# Patient Record
Sex: FEMALE | Race: WHITE | NOT HISPANIC OR LATINO | Employment: FULL TIME
[De-identification: names, ages, dates, MRNs, and addresses within clinical notes are randomized per-mention and may not be internally consistent; named-entity substitution may affect disease eponyms.]

---

## 2017-06-10 ENCOUNTER — HEALTH MAINTENANCE LETTER (OUTPATIENT)
Age: 32
End: 2017-06-10

## 2017-07-28 ENCOUNTER — THERAPY VISIT (OUTPATIENT)
Dept: PHYSICAL THERAPY | Facility: CLINIC | Age: 32
End: 2017-07-28
Payer: COMMERCIAL

## 2017-07-28 DIAGNOSIS — M25.519 SHOULDER PAIN: ICD-10-CM

## 2017-07-28 DIAGNOSIS — M54.2 NECK PAIN: Primary | ICD-10-CM

## 2017-07-28 PROCEDURE — 97110 THERAPEUTIC EXERCISES: CPT | Mod: GP | Performed by: PHYSICAL THERAPIST

## 2017-07-28 PROCEDURE — 97161 PT EVAL LOW COMPLEX 20 MIN: CPT | Mod: GP | Performed by: PHYSICAL THERAPIST

## 2017-07-28 NOTE — PROGRESS NOTES
[facility] Initial Evaluation      Subjective:    Patient is a 32 year old female presenting with rehab cervical spine hpi.   Prudence Mccabe is a 32 year old female with a cervical spine condition.  Condition occurred with:  Insidious onset and repetition/overuse.  Condition occurred: during recreation/sport.  This is a new and chronic condition  Pt saw MD for chronic R shoulder and neck pain on 6/21/17, she reports R shoulder has been chronic issue but neck pain and R finger tingling symptoms increased after a fall at work recently.  .    Patient reports pain:  Cervical right side.  Radiates to:  Shoulder right, upper arm right and hand right.  Pain is described as aching and is intermittent and reported as 1/10.  Associated symptoms:  Tingling, numbness and loss of motion/stiffness. Pain is worse during the day.  Symptoms are exacerbated by rotating head, change of position and certain positions and relieved by rest.  Since onset symptoms are unchanged.    Previous treatment includes physical therapy.  There was moderate improvement following previous treatment.  General health as reported by patient is good.  Pertinent medical history includes:  High blood pressure.  Medical allergies: yes (bactrim).  Surgical history: umbilical hernia.  Current medications:  None as reported by the patient.  Current occupation is  and .  Patient is working in normal job without restrictions.  Primary job tasks include:  Prolonged sitting, prolonged standing, lifting and driving.    Barriers include:  None as reported by the patient.    Red flags:  None as reported by the patient.        CERVICAL:    Posture: forward head and rounded shoulders difficulty correcting and maintaining    Neurological:    Motor Deficit:  Myotomes L R   C4 (shoulder elevation) 5/5 5/5   C5 (shoulder abduction) 5/5 4/5   C6 (elbow flexion) 5/5 5/5   C7 (elbow extension) 5/5 5/5   C8 (thumb extension)     T1  (finger add/abd)      Strength (lb)     MMT R teres minor 4/5, infraspinatus 4/5  Sensory Deficit, Reflexes, Dural Signs: tingling at first distal finger on RUE in C6 dermatome    AROM: (Major, Moderate, Minimal or Nil loss)  Movement Loss Wilbert Mod Min Nil Pain   Protrusion        Flexion        Retraction        Extension        Left Rotation   x  62degs   Right Rotation   x  63degs   Left Side Bending    x 40degs   Right Side bending   x  30degs     Repeated movement testing: some relief with repeated retraction      Static Tests: ligament tests negative, sharps pursor, shear, kick tests, ULNT test negative, limited L C3-7 joint mobility, spurling's + on R  Other Tests: + neer and sams margo on R, +full and empty can      Pt with 1/4 positive clinical tests for cervical radiculopathy, spurling's to R instantly reproduced tingling/numbness on R finger that resolved once out of taht position. Goals of patient is to return to normal daily functional tasks painfree with normal AROM and be able to return to high level volleyball participation for this spring. Plan to address cervical and shoulder soft tissue restriction, strengthen posterior shoulder and RTC, posture, modalities for pain control.                        Objective:    System    Physical Exam    General     ROS    Assessment/Plan:      Patient is a 32 year old female with cervical and right side shoulder complaints.    Patient has the following significant findings with corresponding treatment plan.                Diagnosis 1:  Neck and shoulder pain  Pain -  hot/cold therapy, US, electric stimulation, mechanical traction, manual therapy, education and home program  Decreased ROM/flexibility - manual therapy, therapeutic exercise, therapeutic activity and home program  Decreased joint mobility - manual therapy, therapeutic exercise, therapeutic activity and home program  Decreased strength - therapeutic exercise, therapeutic activities and home  program  Impaired posture - neuro re-education, therapeutic activities and home program    Therapy Evaluation Codes:   1) History comprised of:   Personal factors that impact the plan of care:      None.    Comorbidity factors that impact the plan of care are:      None.     Medications impacting care: None.  2) Examination of Body Systems comprised of:   Body structures and functions that impact the plan of care:      Cervical spine and Shoulder.   Activity limitations that impact the plan of care are:      Sports.  3) Clinical presentation characteristics are:   Stable/Uncomplicated.  4) Decision-Making    Low complexity using standardized patient assessment instrument and/or measureable assessment of functional outcome.  Cumulative Therapy Evaluation is: Low complexity.    Previous and current functional limitations:  (See Goal Flow Sheet for this information)    Short term and Long term goals: (See Goal Flow Sheet for this information)     Communication ability:  Patient appears to be able to clearly communicate and understand verbal and written communication and follow directions correctly.  Treatment Explanation - The following has been discussed with the patient:   RX ordered/plan of care  Anticipated outcomes  Possible risks and side effects  This patient would benefit from PT intervention to resume normal activities.   Rehab potential is good.    Frequency:  1 X week, once daily  Duration:  for 12 weeks  Discharge Plan:  Achieve all LTG.  Independent in home treatment program.  Return to previous functional level by discharge.  Reach maximal therapeutic benefit.    Please refer to the daily flowsheet for treatment today, total treatment time and time spent performing 1:1 timed codes.

## 2017-07-28 NOTE — MR AVS SNAPSHOT
"              After Visit Summary   2017    Prudence Mccabe    MRN: [id number]           Patient Information     Date Of Birth          1985        Visit Information        Provider Department      2017 2:10 PM Ramos Ayon PT [facility]        Today's Diagnoses     Neck pain    -  1    Shoulder pain           Follow-ups after your visit        Who to contact     If you have questions or need follow up information about today's clinic visit or your schedule please contact [facility] directly at [phone].  Normal or non-critical lab and imaging results will be communicated to you by AxioMxhart, letter or phone within 4 business days after the clinic has received the results. If you do not hear from us within 7 days, please contact the clinic through Vedero Softwaret or phone. If you have a critical or abnormal lab result, we will notify you by phone as soon as possible.  Submit refill requests through Giphy or call your pharmacy and they will forward the refill request to us. Please allow 3 business days for your refill to be completed.          Additional Information About Your Visit        MyChart Information     Giphy lets you send messages to your doctor, view your test results, renew your prescriptions, schedule appointments and more. To sign up, go to [email or web address] . Click on \"Log in\" on the left side of the screen, which will take you to the Welcome page. Then click on \"Sign up Now\" on the right side of the page.     You will be asked to enter the access code listed below, as well as some personal information. Please follow the directions to create your username and password.     Your access code is: [email or web address]  Expires: 10/26/2017  3:55 PM     Your access code will  in 90 days. If you need help or a new code, please call your [place] clinic or [phone].        Care EveryWhere ID     This is your Care EveryWhere ID. This could be used " by other organizations to access your [place] medical records  [id number]         Blood Pressure from Last 3 Encounters:   No data found for BP    Weight from Last 3 Encounters:   No data found for Wt              We Performed the Following     HC PT EVAL, LOW COMPLEXITY     [facility] INITIAL EVAL REPORT     THERAPEUTIC EXERCISES        Primary Care Provider Office Phone # Fax #    Aron Gonsalves [phone]       [facility] [address] [place] [address]        Equal Access to Services     [facility]: Hadii aad ku hadasho Soomaali, waaxda luqadaha, qaybta kaalmada adeegyada, waxay idiin hayaan adeeg kharash la'aan . So [facility] [phone].    ATENCIÓN: Si habla español, tiene a dutton disposición servicios gratuitos de asistencia lingüística. [facility] [phone].    We comply with applicable federal civil rights laws and [place] laws. We do not discriminate on the basis of race, color, national origin, age, disability sex, sexual orientation or gender identity.            Thank you!     Thank you for choosing [facility]  for your care. Our goal is always to provide you with excellent care. Hearing back from our patients is one way we can continue to improve our services. Please take a few minutes to complete the written survey that you may receive in the mail after your visit with us. Thank you!             Your Updated Medication List - Protect others around you: Learn how to safely use, store and throw away your medicines at www.disposemymeds.org.      Notice  As of 7/28/2017  3:55 PM    You have not been prescribed any medications.

## 2017-07-28 NOTE — LETTER
[facility]  [address]  [place] [address]  [phone]    2017    Re: Prudence Mccabe   :   1985  MRN:  [id number]   REFERRING PHYSICIAN:   [facility]    Date of Initial Evaluation:  17  Visits:  Rxs Used: 1  Reason for Referral:      [place] [facility] Initial Evaluation    Subjective:  Patient is a 32 year old female presenting with rehab cervical spine hpi.   Prudence Mccabe is a 32 year old female with a cervical spine condition.  Condition occurred with:  Insidious onset and repetition/overuse.  Condition occurred: during recreation/sport.  This is a new and chronic condition  Pt saw MD for chronic R shoulder and neck pain on 17, she reports R shoulder has been chronic issue but neck pain and R finger tingling symptoms increased after a fall at work recently.  .    Patient reports pain:  Cervical right side.  Radiates to:  Shoulder right, upper arm right and hand right.  Pain is described as aching and is intermittent and reported as 1/10.  Associated symptoms:  Tingling, numbness and loss of motion/stiffness. Pain is worse during the day.  Symptoms are exacerbated by rotating head, change of position and certain positions and relieved by rest.  Since onset symptoms are unchanged.    Previous treatment includes physical therapy.  There was moderate improvement following previous treatment.  General health as reported by patient is good.  Pertinent medical history includes:  High blood pressure.  Medical allergies: yes (bactrim).  Surgical history: umbilical hernia.  Current medications:  None as reported by the patient.  Current occupation is  and .  Patient is working in normal job without restrictions.  Primary job tasks include:  Prolonged sitting, prolonged standing, lifting and driving.  Barriers include:  None as reported by the patient.  Red flags:  None as reported by  the patient.    CERVICAL:    Posture: forward head and rounded shoulders difficulty correcting and maintaining    Neurological:    Motor Deficit:  Myotomes L R   C4 (shoulder elevation) 5/5 5/5   C5 (shoulder abduction) 5/5 4/5   C6 (elbow flexion) 5/5 5/5   C7 (elbow extension) 5/5 5/5   C8 (thumb extension)     T1 (finger add/abd)      Strength (lb)     MMT R teres minor 4/5, infraspinatus 4/5  Sensory Deficit, Reflexes, Dural Signs: tingling at first distal finger on RUE in C6 dermatome    AROM: (Major, Moderate, Minimal or Nil loss)  Movement Loss Wilbert Mod Min Nil Pain   Protrusion        Flexion        Retraction        Extension        Left Rotation   x  62degs   Right Rotation   x  63degs   Left Side Bending    x 40degs   Right Side bending   x  30degs     Repeated movement testing: some relief with repeated retraction    Static Tests: ligament tests negative, sharps pursor, shear, kick tests, ULNT test negative, limited L C3-7 joint mobility, spurling's + on R    Other Tests: + neer and sams margo on R, +full and empty can  Pt with 1/4 positive clinical tests for cervical radiculopathy, spurling's to R instantly reproduced tingling/numbness on R finger that resolved once out of taht position. Goals of patient is to return to normal daily functional tasks painfree with normal AROM and be able to return to high level volleyball participation for this spring. Plan to address cervical and shoulder soft tissue restriction, strengthen posterior shoulder and RTC, posture, modalities for pain control.    Assessment/Plan:    Patient is a 32 year old female with cervical and right side shoulder complaints.    Patient has the following significant findings with corresponding treatment plan.                Diagnosis 1:  Neck and shoulder pain  Pain -  hot/cold therapy, US, electric stimulation, mechanical traction, manual therapy, education and home program  Decreased ROM/flexibility - manual therapy,  therapeutic exercise, therapeutic activity and home program  Decreased joint mobility - manual therapy, therapeutic exercise, therapeutic activity and home program  Decreased strength - therapeutic exercise, therapeutic activities and home program  Impaired posture - neuro re-education, therapeutic activities and home program    Therapy Evaluation Codes:   1) History comprised of:   Personal factors that impact the plan of care:      None.    Comorbidity factors that impact the plan of care are:      None.     Medications impacting care: None.  2) Examination of Body Systems comprised of:   Body structures and functions that impact the plan of care:      Cervical spine and Shoulder.   Activity limitations that impact the plan of care are:      Sports.  3) Clinical presentation characteristics are:   Stable/Uncomplicated.  4) Decision-Making    Low complexity using standardized patient assessment instrument and/or measureable assessment of functional outcome.    Cumulative Therapy Evaluation is: Low complexity.  Previous and current functional limitations:  (See Goal Flow Sheet for this information)    Short term and Long term goals: (See Goal Flow Sheet for this information)   Communication ability:  Patient appears to be able to clearly communicate and understand verbal and written communication and follow directions correctly.  Treatment Explanation - The following has been discussed with the patient:   RX ordered/plan of care  Anticipated outcomes  Possible risks and side effects  This patient would benefit from PT intervention to resume normal activities.   Rehab potential is good.    Frequency:  1 X week, once daily  Duration:  for 12 weeks  Discharge Plan:  Achieve all LTG.  Independent in home treatment program.  Return to previous functional level by discharge.  Reach maximal therapeutic benefit.    Thank you for your referral.    INQUIRIES  Therapist: Ramos Ayon DPT PHYSICAL THERAPY  [id number]  [place] [address]  Phone: [phone]  Fax: [phone]

## 2017-08-04 ENCOUNTER — THERAPY VISIT (OUTPATIENT)
Dept: PHYSICAL THERAPY | Facility: CLINIC | Age: 32
End: 2017-08-04
Payer: COMMERCIAL

## 2017-08-04 DIAGNOSIS — M25.519 SHOULDER PAIN: ICD-10-CM

## 2017-08-04 DIAGNOSIS — M54.2 NECK PAIN: ICD-10-CM

## 2017-08-04 PROCEDURE — 97112 NEUROMUSCULAR REEDUCATION: CPT | Mod: GP | Performed by: PHYSICAL THERAPIST

## 2017-08-04 PROCEDURE — 97035 APP MDLTY 1+ULTRASOUND EA 15: CPT | Mod: GP | Performed by: PHYSICAL THERAPIST

## 2017-08-04 PROCEDURE — 97110 THERAPEUTIC EXERCISES: CPT | Mod: GP | Performed by: PHYSICAL THERAPIST

## 2017-08-25 ENCOUNTER — THERAPY VISIT (OUTPATIENT)
Dept: PHYSICAL THERAPY | Facility: CLINIC | Age: 32
End: 2017-08-25
Payer: COMMERCIAL

## 2017-08-25 DIAGNOSIS — M25.519 SHOULDER PAIN: ICD-10-CM

## 2017-08-25 DIAGNOSIS — M54.2 NECK PAIN: ICD-10-CM

## 2017-08-25 PROCEDURE — 97112 NEUROMUSCULAR REEDUCATION: CPT | Mod: GP | Performed by: PHYSICAL THERAPIST

## 2017-08-25 PROCEDURE — 97110 THERAPEUTIC EXERCISES: CPT | Mod: GP | Performed by: PHYSICAL THERAPIST

## 2017-08-25 NOTE — PROGRESS NOTES
Subjective:    HPI                    Objective:    System    Physical Exam    General     ROS    Assessment/Plan:      PROGRESS  REPORT    Progress reporting period is from 7/28/17 to 8/25/17.     SUBJECTIVE  Subjective: pt reports neck has been feeling better but gets tight occasionally. admits her finger is feeling normal now and does not have the tingling/numbness in R distal pointer finger.   Current Pain level: 0/10   Initial Pain level: 1/10   Changes in function: Yes, see goal flow sheet for change in function   Adverse reactions: None;   ,     The objective findings are from DOS 8/25/17.    OBJECTIVE  Objective: AROM cervical sidebending L: 37degs R: 35degs rotation L:72degs R: 69degs      ASSESSMENT/PLAN  Updated problem list and treatment plan: Diagnosis 1:  Neck/shoulder pain  STG/LTGs have been met or progress has been made towards goals:  Yes, is performing all ADLs including working out and lifting painfree, progressing towards painree return to volleyball.  Assessment of Progress: The patient's condition is improving.  The patient's condition has potential to improve.  Self Management Plans:  Patient has been instructed in a home treatment program.  Patient is independent in a home treatment program.  I have re-evaluated this patient and find that the nature, scope, duration and intensity of the therapy is appropriate for the medical condition of the patient.  Prudence continues to require the following intervention to meet STG and LTG's: PT    Recommendations:  This patient would benefit from continued therapy.     Frequency:  1-2 X a month, once daily  Duration:  for 2 months          Please refer to the daily flowsheet for treatment today, total treatment time and time spent performing 1:1 timed codes.

## 2017-08-25 NOTE — LETTER
[facility]  [address]  [place] [address]  [phone]    2017    Re: Prudence Mccabe   :   1985  MRN:  [id number]   REFERRING PHYSICIAN:   [facility]    Date of Initial Evaluation:  17  Visits:  Rxs Used: 3  Reason for Referral:     Neck pain  Shoulder pain    PROGRESS  REPORT  Progress reporting period is from 17 to 17.     SUBJECTIVE  Subjective: pt reports neck has been feeling better but gets tight occasionally. admits her finger is feeling normal now and does not have the tingling/numbness in R distal pointer finger.   Current Pain level: 0/10   Initial Pain level: 1/10   Changes in function: Yes, see goal flow sheet for change in function   Adverse reactions: None   The objective findings are from DOS 17.    OBJECTIVE  Objective: AROM cervical sidebending L: 37degs R: 35degs rotation L:72degs R: 69degs      ASSESSMENT/PLAN  Updated problem list and treatment plan: Diagnosis 1:  Neck/shoulder pain  STG/LTGs have been met or progress has been made towards goals:  Yes, is performing all ADLs including working out and lifting painfree, progressing towards painree return to volleyball.  Assessment of Progress: The patient's condition is improving.  The patient's condition has potential to improve.  Self Management Plans:  Patient has been instructed in a home treatment program.  Patient is independent in a home treatment program.  I have re-evaluated this patient and find that the nature, scope, duration and intensity of the therapy is appropriate for the medical condition of the patient.  Prudence continues to require the following intervention to meet STG and LTG's: PT    Recommendations:  This patient would benefit from continued therapy.     Frequency:  1-2 X a month, once daily  Duration:  for 2 months    Thank you for your referral.    INQUIRIES  Therapist: Ramos Ayon DPT PHYSICAL  THERAPY  [address] [place] [address]  Phone: [phone]  Fax: [phone]

## 2017-08-25 NOTE — MR AVS SNAPSHOT
"              After Visit Summary   8/25/2017    Prudence Mccabe    MRN: [id number]           Patient Information     Date Of Birth          1985        Visit Information        Provider Department      8/25/2017 2:50 PM Ramos Ayon PT [facility]        Today's Diagnoses     Neck pain        Shoulder pain           Follow-ups after your visit        Your next 10 appointments already scheduled     Sep 22, 2017  5:30 PM CDT   [facility] Ext[facility] ([facility])    [address]  [place] [address] [phone]              Who to contact     If you have questions or need follow up information about today's clinic visit or your schedule please contact [facility] directly at [phone].  Normal or non-critical lab and imaging results will be communicated to you by Avancerthart, letter or phone within 4 business days after the clinic has received the results. If you do not hear from us within 7 days, please contact the clinic through Avancerthart or phone. If you have a critical or abnormal lab result, we will notify you by phone as soon as possible.  Submit refill requests through [email or web address] or call your pharmacy and they will forward the refill request to us. Please allow 3 business days for your refill to be completed.          Additional Information About Your Visit        MyChart Information     [email or web address] lets you send messages to your doctor, view your test results, renew your prescriptions, schedule appointments and more. To sign up, go to [email or web address] . Click on \"Log in\" on the left side of the screen, which will take you to the Welcome page. Then click on \"Sign up Now\" on the right side of the page.     You will be asked to enter the access code listed below, as well as some personal information. Please follow the directions to create your username and password.     Your access code is: [email or web address]  Expires: " 10/26/2017  3:55 PM     Your access code will  in 90 days. If you need help or a new code, please call your [place] clinic or [phone].        Care EveryWhere ID     This is your Care EveryWhere ID. This could be used by other organizations to access your [place] medical records  [id number]         Blood Pressure from Last 3 Encounters:   No data found for BP    Weight from Last 3 Encounters:   No data found for Wt              We Performed the Following     [facility] PROGRESS NOTES REPORT     NEUROMUSCULAR RE-EDUCATION     THERAPEUTIC EXERCISES        Primary Care Provider Office Phone # Fax #    Aron Gonsalves [phone]       [facility] [address] [place] [address]        Equal Access to Services     [facility]: Hadii aad ku hadasho Soomaali, waaxda luqadaha, qaybta kaalmada adeegyada, waxay idiin hayshraddhan carola rausch . So [facility] [phone].    ATENCIÓN: Si habla español, tiene a dutton disposición servicios gratuitos de asistencia lingüística. Llame al [phone].    We comply with applicable federal civil rights laws and [place] laws. We do not discriminate on the basis of race, color, national origin, age, disability sex, sexual orientation or gender identity.            Thank you!     Thank you for choosing [facility]  for your care. Our goal is always to provide you with excellent care. Hearing back from our patients is one way we can continue to improve our services. Please take a few minutes to complete the written survey that you may receive in the mail after your visit with us. Thank you!             Your Updated Medication List - Protect others around you: Learn how to safely use, store and throw away your medicines at www.disposemymeds.org.      Notice  As of 2017  6:08 PM    You have not been prescribed any medications.

## 2017-09-22 ENCOUNTER — THERAPY VISIT (OUTPATIENT)
Dept: PHYSICAL THERAPY | Facility: CLINIC | Age: 32
End: 2017-09-22
Payer: COMMERCIAL

## 2017-09-22 DIAGNOSIS — M54.2 NECK PAIN: ICD-10-CM

## 2017-09-22 DIAGNOSIS — M25.519 SHOULDER PAIN: ICD-10-CM

## 2017-09-22 PROCEDURE — 97140 MANUAL THERAPY 1/> REGIONS: CPT | Mod: GP | Performed by: PHYSICAL THERAPIST

## 2017-09-22 PROCEDURE — 97112 NEUROMUSCULAR REEDUCATION: CPT | Mod: GP | Performed by: PHYSICAL THERAPIST

## 2017-09-22 PROCEDURE — 97110 THERAPEUTIC EXERCISES: CPT | Mod: GP | Performed by: PHYSICAL THERAPIST

## 2017-10-10 ENCOUNTER — THERAPY VISIT (OUTPATIENT)
Dept: PHYSICAL THERAPY | Facility: CLINIC | Age: 32
End: 2017-10-10
Payer: COMMERCIAL

## 2017-10-10 DIAGNOSIS — M25.519 SHOULDER PAIN: ICD-10-CM

## 2017-10-10 DIAGNOSIS — M54.2 NECK PAIN: ICD-10-CM

## 2017-10-10 PROCEDURE — 97140 MANUAL THERAPY 1/> REGIONS: CPT | Mod: GP | Performed by: PHYSICAL THERAPIST

## 2017-10-10 PROCEDURE — 97110 THERAPEUTIC EXERCISES: CPT | Mod: GP | Performed by: PHYSICAL THERAPIST

## 2017-10-27 ENCOUNTER — THERAPY VISIT (OUTPATIENT)
Dept: PHYSICAL THERAPY | Facility: CLINIC | Age: 32
End: 2017-10-27
Payer: COMMERCIAL

## 2017-10-27 DIAGNOSIS — M25.519 SHOULDER PAIN: ICD-10-CM

## 2017-10-27 DIAGNOSIS — M54.2 NECK PAIN: ICD-10-CM

## 2017-10-27 PROCEDURE — 97140 MANUAL THERAPY 1/> REGIONS: CPT | Mod: GP | Performed by: PHYSICAL THERAPIST

## 2017-10-27 PROCEDURE — 97110 THERAPEUTIC EXERCISES: CPT | Mod: GP | Performed by: PHYSICAL THERAPIST

## 2017-10-27 NOTE — LETTER
[facility]  [address]  [place] [address]  [phone]    2017    Re: Prudence Mccabe   :   1985  MRN:  [id number]   REFERRING PHYSICIAN:   [facility]    Date of Initial Evaluation:  17  Visits:  Rxs Used: 6  Reason for Referral:     Neck pain  Shoulder pain    PROGRESS  REPORT  Progress reporting period is from 17 to 10/27/17.     SUBJECTIVE  Subjective: pt reports volleyball is going well, played in her first tournament last weekend which went well but did have muscle soreness after and since. admits shoulder tightness.   Current Pain level: 4/10   Initial Pain level: 10   Changes in function: Yes, see goal flow sheet for change in function   Adverse reactions: None  The objective findings are from DOS 10/27/17.    OBJECTIVE  Objective: Tight R upper trap, intermittent R bicep irritation and R thumb and first two finger paresthesias during session that improved with median nerve mobility. full AROM R shoulder with pain during abd      ASSESSMENT/PLAN  Updated problem list and treatment plan: Diagnosis 1:  Neck and shoulder pain  STG/LTGs have been met or progress has been made towards goals:  Yes, progressing towards return to volleyball painfree.  Assessment of Progress: The patient's condition is improving.  The patient's condition has potential to improve.  Self Management Plans:  Patient is independent in a home treatment program.  I have re-evaluated this patient and find that the nature, scope, duration and intensity of the therapy is appropriate for the medical condition of the patient.  Prudence continues to require the following intervention to meet STG and LTG's: PT    Recommendations:  Prudence is doing well, she has been playing volleyball at a high level with some good games and some bad games in regards to her shoulder pain. We are addressing neck and shoulder pain and current plan is to continue to  progress shoulder strength and stability.    Thank you for your referral.    INQUIRIES  Therapist: Ramos [facility]yon [facility]  [address] [place] [address]  Phone: [phone]  Fax: [phone]

## 2017-10-27 NOTE — MR AVS SNAPSHOT
"              After Visit Summary   10/27/2017    Prudence Mccabe    MRN: [id number]           Patient Information     Date Of Birth          1985        Visit Information        Provider Department      10/27/2017 7:40 AM Ramos Ayon PT [facility]        Today's Diagnoses     Neck pain        Shoulder pain           Follow-ups after your visit        Who to contact     If you have questions or need follow up information about today's clinic visit or your schedule please contact [facility] directly at [phone].  Normal or non-critical lab and imaging results will be communicated to you by [employer], letter or phone within 4 business days after the clinic has received the results. If you do not hear from us within 7 days, please contact the clinic through [employer] or phone. If you have a critical or abnormal lab result, we will notify you by phone as soon as possible.  Submit refill requests through [employer] or call your pharmacy and they will forward the refill request to us. Please allow 3 business days for your refill to be completed.          Additional Information About Your Visit        MyChart Information     [employer] lets you send messages to your doctor, view your test results, renew your prescriptions, schedule appointments and more. To sign up, go to [email or web address] . Click on \"Log in\" on the left side of the screen, which will take you to the Welcome page. Then click on \"Sign up Now\" on the right side of the page.     You will be asked to enter the access code listed below, as well as some personal information. Please follow the directions to create your username and password.     Your access code is: [email or web address]  Expires: 2018  5:13 PM     Your access code will  in 90 days. If you need help or a new code, please call your [place] clinic or [phone].        Care EveryWhere ID     This is your Care EveryWhere ID. This could be used by " other organizations to access your [place] medical records  [id number]         Blood Pressure from Last 3 Encounters:   No data found for BP    Weight from Last 3 Encounters:   No data found for Wt              We Performed the Following     [facility] PROGRESS NOTES REPORT     MANUAL THER TECH,1+REGIONS,EA 15 MIN     THERAPEUTIC EXERCISES        Primary Care Provider Office Phone # Fax #    Aron Gonsalves [phone]       [facility] [address] [place] [address]        Equal Access to Services     : Hadii aad ku hadasho Soomaali, waaxda luqadaha, qaybta kaalmada adeegyada, waxay idiin hayaan adeeg kharash la'aan . So [facility] [phone].    ATENCIÓN: Si habla español, tiene a dutton disposición servicios gratuitos de asistencia lingüística. [facility] [phone].    We comply with applicable federal civil rights laws and [place] laws. We do not discriminate on the basis of race, color, national origin, age, disability, sex, sexual orientation, or gender identity.            Thank you!     Thank you for choosing [facility]  for your care. Our goal is always to provide you with excellent care. Hearing back from our patients is one way we can continue to improve our services. Please take a few minutes to complete the written survey that you may receive in the mail after your visit with us. Thank you!             Your Updated Medication List - Protect others around you: Learn how to safely use, store and throw away your medicines at www.disposemymeds.org.      Notice  As of 10/27/2017  5:13 PM    You have not been prescribed any medications.

## 2017-10-27 NOTE — PROGRESS NOTES
Subjective:    HPI                    Objective:    System    Physical Exam    General     ROS    Assessment/Plan:      PROGRESS  REPORT    Progress reporting period is from 8/25/17 to 10/27/17.     SUBJECTIVE  Subjective: pt reports volleyball is going well, played in her first tournament last weekend which went well but did have muscle soreness after and since. admits shoulder tightness.   Current Pain level: 4/10   Initial Pain level: 1/10   Changes in function: Yes, see goal flow sheet for change in function   Adverse reactions: None;   ,     The objective findings are from DOS 10/27/17.    OBJECTIVE  Objective: Tight R upper trap, intermittent R bicep irritation and R thumb and first two finger paresthesias during session that improved with median nerve mobility. full AROM R shoulder with pain during abd      ASSESSMENT/PLAN  Updated problem list and treatment plan: Diagnosis 1:  Neck and shoulder pain  STG/LTGs have been met or progress has been made towards goals:  Yes, progressing towards return to volleyball painfree.  Assessment of Progress: The patient's condition is improving.  The patient's condition has potential to improve.  Self Management Plans:  Patient is independent in a home treatment program.  I have re-evaluated this patient and find that the nature, scope, duration and intensity of the therapy is appropriate for the medical condition of the patient.  Prudence continues to require the following intervention to meet STG and LTG's: PT    Recommendations:  Prudence is doing well, she has been playing volleyball at a high level with some good games and some bad games in regards to her shoulder pain. We are addressing neck and shoulder pain and current plan is to continue to progress shoulder strength and stability.    Please refer to the daily flowsheet for treatment today, total treatment time and time spent performing 1:1 timed codes.

## 2017-12-08 PROBLEM — M54.2 NECK PAIN: Status: RESOLVED | Noted: 2017-07-28 | Resolved: 2017-12-08

## 2017-12-08 PROBLEM — M25.519 SHOULDER PAIN: Status: RESOLVED | Noted: 2017-07-28 | Resolved: 2017-12-08

## 2019-08-02 ENCOUNTER — THERAPY VISIT (OUTPATIENT)
Dept: PHYSICAL THERAPY | Facility: CLINIC | Age: 34
End: 2019-08-02
Payer: COMMERCIAL

## 2019-08-02 DIAGNOSIS — M25.519 SHOULDER PAIN: Primary | ICD-10-CM

## 2019-08-02 PROBLEM — M25.511 RIGHT SHOULDER PAIN: Status: ACTIVE | Noted: 2017-07-28

## 2019-08-02 PROCEDURE — 97110 THERAPEUTIC EXERCISES: CPT | Mod: GP | Performed by: PHYSICAL THERAPIST

## 2019-08-02 NOTE — PROGRESS NOTES
[facility] Initial Evaluation  Subjective:    Type of problem:  Right shoulder   Condition occurred with:  Repetition/overuse and lifting. This is a recurrent condition   Problem details: 7/11/19 pt saw MD for recurrent R shoulder pain, has been lifting and goal to play volleyball. Pt reports 2/10 pain with lifting and reports health as excellent, PMH asthma, high blood pressure and had surgical hernia repair. Works as a  and  with primary tasks including computer work, lifting carrying and prolonged standing..   Patient reports pain:  Anterior and lateral. Radiates to: none. Associated symptoms:  Loss of motion/stiffness and loss of strength. Symptoms are exacerbated by carrying, certain positions and lifting and relieved by nothing.                      Objective:      SHOULDER:    Cervical Screen: normal and painfree    Shoulder:   PROM L PROM R AROM L AROM R MMT L MMT R   Flex   175 167 5/5 4/5   Abd   175 175 5/5 4/5   Full Can     5/5 4/5   Empty Can     5/5 4/5   IR   T6 T7 5/5 4/5+   ER   75 70 5/5 4/5   Ext/IR           Scapulothoraic Rhythm: normal and painfree    Palpation: mild TTP R anterior and anterolateral shoulder    Special tests:   L R   Impingement     Neer's neg neg   Hawkin's-Emiliano + +   Coracoid Impingement     Internal impingement     Labral     Anterior Slide     [place]'s     Crank     Instability     Apprehension (anterior)     Relocation (anterior)     Anterior Load & Shift     Posterior Load & Shift     Posterior instability (with 90 degrees flex)     Multi-Directional Instability      Sulcus     Biceps      Speed's neg neg   Rotator Cuff Tear     Drop Arm neg    Belly Press + +   Lift off  + +     GH Mobility  L  R   Posterior glide wnl limited   Inferior glide wnl wnl   Anterior glide wnl wnl               System    Physical Exam    General     ROS    Assessment/Plan:    Patient is a 34 year old female with right side shoulder complaints.     Patient has the following significant findings with corresponding treatment plan.                Diagnosis 1:  R shoulder pain  Pain -  hot/cold therapy, US, electric stimulation, manual therapy, splint/taping/bracing/orthotics, education and home program  Decreased ROM/flexibility - manual therapy, therapeutic exercise, therapeutic activity and home program  Decreased joint mobility - manual therapy, therapeutic exercise, therapeutic activity and home program  Decreased strength - therapeutic exercise, therapeutic activities and home program      Previous and current functional limitations:  (See Goal Flow Sheet for this information)    Short term and Long term goals: (See Goal Flow Sheet for this information)     Communication ability:  Patient appears to be able to clearly communicate and understand verbal and written communication and follow directions correctly.  Treatment Explanation - The following has been discussed with the patient:   RX ordered/plan of care  Anticipated outcomes  Possible risks and side effects  This patient would benefit from PT intervention to resume normal activities.   Rehab potential is good.    Frequency:  1 X week, once daily  Duration:  for 10 weeks  Discharge Plan:  Achieve all LTG.  Independent in home treatment program.  Reach maximal therapeutic benefit.    Please refer to the daily flowsheet for treatment today, total treatment time and time spent performing 1:1 timed codes.

## 2019-08-02 NOTE — LETTER
[facility]  [address]  [place] [address]  [phone]    2019    Re: Prudence Mccabe   :   1985  MRN:  [id number]   REFERRING PHYSICIAN:   [facility]    Date of Initial Evaluation:  2019  Visits: 1 Rxs Used: 1  Reason for Referral:  Shoulder pain    [facility] Initial Evaluation  Subjective:  Type of problem:  Right shoulder   Condition occurred with:  Repetition/overuse and lifting. This is a recurrent condition   Problem details: 19 pt saw MD for recurrent R shoulder pain, has been lifting and goal to play volleyball. Pt reports 2/10 pain with lifting and reports health as excellent, PMH asthma, high blood pressure and had surgical hernia repair. Works as a  and  with primary tasks including computer work, lifting carrying and prolonged standing..   Patient reports pain:  Anterior and lateral. Radiates to: none. Associated symptoms:  Loss of motion/stiffness and loss of strength. Symptoms are exacerbated by carrying, certain positions and lifting and relieved by nothing.    Pertinent medical history includes:  Asthma and high blood pressure.    Surgeries include:  Other. Other surgery history details: hernia repair. Primary job tasks include:  Computer work, lifting/carrying and prolonged standing. Patient is /.     Objective:  SHOULDER:  Cervical Screen: normal and painfree    Shoulder:   PROM L PROM R AROM L AROM R MMT L MMT R   Flex   175 167 5/5 4/5   Abd   175 175 5/5 4/5   Full Can     5/5 4/5   Empty Can     5/5 4/5   IR   T6 T7 5/5 4/5+   ER   75 70 5/5 4/5   Ext/IR           Scapulothoraic Rhythm: normal and painfree    Palpation: mild TTP R anterior and anterolateral shoulder    Special tests:   L R   Impingement     Neer's neg neg   Hawkin's-Emiliano + +   Coracoid Impingement     Internal impingement     Labral     Anterior Slide      [place]'s     Crank     Instability     Apprehension (anterior)     Relocation (anterior)     Anterior Load & Shift     Posterior Load & Shift     Posterior instability (with 90 degrees flex)     Multi-Directional Instability      Sulcus     Biceps      Speed's neg neg   Rotator Cuff Tear     Drop Arm neg    Belly Press + +   Lift off  + +     GH Mobility  L  R   Posterior glide wnl limited   Inferior glide wnl wnl   Anterior glide wnl wnl     Assessment/Plan:    Patient is a 34 year old female with right side shoulder complaints.    Patient has the following significant findings with corresponding treatment plan.                Diagnosis 1:  R shoulder pain  Pain -  hot/cold therapy, US, electric stimulation, manual therapy, splint/taping/bracing/orthotics, education and home program  Decreased ROM/flexibility - manual therapy, therapeutic exercise, therapeutic activity and home program  Decreased joint mobility - manual therapy, therapeutic exercise, therapeutic activity and home program  Decreased strength - therapeutic exercise, therapeutic activities and home program      Previous and current functional limitations:  (See Goal Flow Sheet for this information)    Short term and Long term goals: (See Goal Flow Sheet for this information)     Communication ability:  Patient appears to be able to clearly communicate and understand verbal and written communication and follow directions correctly.  Treatment Explanation - The following has been discussed with the patient:   RX ordered/plan of care  Anticipated outcomes  Possible risks and side effects  Re: Prudence Mccabe   :   1985  This patient would benefit from PT intervention to resume normal activities.   Rehab potential is good.    Frequency:  1 X week, once daily  Duration:  for 10 weeks  Discharge Plan:  Achieve all LTG.  Independent in home treatment program.  Reach maximal therapeutic benefit.    Thank you for your  referral.    INQUIRIES  Therapist: [facility]  [address] [place] [address]  Phone: [phone]  Fax: [phone]

## 2019-08-05 NOTE — PROGRESS NOTES
[facility] Initial Evaluation  Subjective:       Pertinent medical history includes:  Asthma and high blood pressure.    Surgeries include:  Other. Other surgery history details: hernia repair.     Primary job tasks include:  Computer work, lifting/carrying and prolonged standing.           Patient is /.                           Objective:  System    Physical Exam    General     ROS    Assessment/Plan:

## 2019-08-09 ENCOUNTER — THERAPY VISIT (OUTPATIENT)
Dept: PHYSICAL THERAPY | Facility: CLINIC | Age: 34
End: 2019-08-09
Payer: COMMERCIAL

## 2019-08-09 DIAGNOSIS — M25.519 SHOULDER PAIN: Primary | ICD-10-CM

## 2019-08-09 PROCEDURE — 97112 NEUROMUSCULAR REEDUCATION: CPT | Mod: GP | Performed by: PHYSICAL THERAPIST

## 2019-08-09 PROCEDURE — 97110 THERAPEUTIC EXERCISES: CPT | Mod: GP | Performed by: PHYSICAL THERAPIST

## 2019-08-16 ENCOUNTER — THERAPY VISIT (OUTPATIENT)
Dept: PHYSICAL THERAPY | Facility: CLINIC | Age: 34
End: 2019-08-16
Payer: COMMERCIAL

## 2019-08-16 DIAGNOSIS — M25.519 SHOULDER PAIN: Primary | ICD-10-CM

## 2019-08-16 PROCEDURE — 97110 THERAPEUTIC EXERCISES: CPT | Mod: GP | Performed by: PHYSICAL THERAPIST

## 2019-08-16 PROCEDURE — 97112 NEUROMUSCULAR REEDUCATION: CPT | Mod: GP | Performed by: PHYSICAL THERAPIST

## 2019-09-10 ENCOUNTER — THERAPY VISIT (OUTPATIENT)
Dept: PHYSICAL THERAPY | Facility: CLINIC | Age: 34
End: 2019-09-10
Payer: COMMERCIAL

## 2019-09-10 DIAGNOSIS — M25.519 SHOULDER PAIN: Primary | ICD-10-CM

## 2019-09-10 PROCEDURE — 97140 MANUAL THERAPY 1/> REGIONS: CPT | Mod: GP | Performed by: PHYSICAL THERAPIST

## 2019-09-10 PROCEDURE — 97112 NEUROMUSCULAR REEDUCATION: CPT | Mod: GP | Performed by: PHYSICAL THERAPIST

## 2020-01-31 NOTE — PROGRESS NOTES
"Subjective:  HPI                    Objective:  System    Physical Exam    General     ROS    Assessment/Plan:    DISCHARGE REPORT    Progress reporting period is from 8/2/19 to 9/10/19.     SUBJECTIVE  Subjective: pt reports her R shoulder hasn't been \"too bad\". begins volleyball league on 10/1   Current Pain level: 0/10   Initial Pain level: 2/10   Changes in function: No changes noted in function since last SOAP note   Adverse reactions: None;   ,     The objective findings are from DOS 9/10/19.    OBJECTIVE  Objective: improved tolerance to activity      ASSESSMENT/PLAN  Updated problem list and treatment plan: Diagnosis 1:  R shoulder pain  STG/LTGs have been met or progress has been made towards goals:  Yes, progressing towards return to volleyball  Assessment of Progress: The patient's condition is improving.  Self Management Plans:  Patient is independent in a home treatment program.  Prudence continues to require the following intervention to meet STG and LTG's: PT intervention is no longer required to meet STG/LTG.  We will discharge this patient from PT.    Recommendations:  This patient is ready to be discharged from therapy and continue their home treatment program.    Please refer to the daily flowsheet for treatment today, total treatment time and time spent performing 1:1 timed codes.    "

## 2022-01-20 NOTE — PROGRESS NOTES
Physical Therapy Initial Examination/Evaluation  January 21, 2022    Therapist Impression: Prudence is a 36 year old year old female referred to physical therapy by Terrie Gonzales MD for treatment of chronic right shoulder pain. Patient presents to physical therapy with c/o right arm pain and numbness. Signs and symptoms are consistent with shoulder impingement with radicular numbness in C6 distribution. Due to these impairments, patient is unable to perform full work duties, lift greater than 30 pounds or sleep comfortably on right side. Patient will benefit from skilled PT in order to address impairments/limitations in order to return patient to goals and prior level of function.     MD Order: 1-2x/wk for 6 weeks for strength, ROM, pain control     Subjective:  Presenting Complaint Right shoulder pain   Mechanism of Injury  Fall, slipped and fell on elbow   DOI (onset)/ DOS 12/16/2021   Functional Limitations Sleeping, lifting, carrying    Notable PMH See Epic chart    Prior treatment PT at this location prior   good   Prior Imaging  x-ray- negative findings no fractures        Pain/Presentation    Pain Level   Rest: 1/10  ; Activity: 3/10   Location   right shoulder; numbness into thumb and pointer when waking up    Frequency Intermittent   Described as aching, dull and sharp, numbness    Alleviated by  Rest   Progression of Sx Gradually getting better.   Time of Day  Activity related and Position related   Sleeping  Interrupted due to current issue; sleeping on the right side is tender       Social Factors/Lifestyle  Occupation and Duties [employer]   prolonged sitting, lifting/carrying, pushing/pulling, driving  -No more than 30#    Barriers at home/work None as reported by patient   Medications Anti-inflammatory (800 mg in morning and repeat as needed)    Current HEP/Exercise Unable to play volleyball since fall; running on treadmill    Patient Reported Health Excellent      Patient Goals:  1) back  to work at full capacity   2) volleyball as able   3) able to sleep on right side without issue     Other factors/PMH that may impact care: none      The history is provided by the patient.   Patient Health History  Prudence Mitchell being seen for right arm injury.     Problem began: 12/16/2021.   Problem occurred: from a fall    Pain is reported as 1/10 on pain scale.  General health as reported by patient is excellent.  Pertinent medical history includes: asthma, high blood pressure and numbness/tingling.         Other surgery history details: hernia repair .    Current medications:  High blood pressure medication. Other medications details: birth control .    Current occupation is / .   Primary job tasks include:  Computer work, lifting/carrying and pushing/pulling.                                    SHOULDER EXAMINATION  Handedness: Right    CERVICAL SCREEN  AROM: WNL/symmetrical ; tight with left lateral flexion with pulling and tightness in upper trap and right side of neck  -Notes numbness/tingling in thumb and pointer (C6) that occurs with laying on side or stretching tight muscles.     THORACIC SPINE SCREEN  WNL      STATIC POSTURE  Forward head: mild     Rounded shoulders:mild  Shoulder internally rotated: greater on R        KINEMATIC SCAPULAR ASSESSMENT  General Dynamic Scapular Assessment: No obvious findings    SHOULDER RANGE OF MOTION  AROM Flexion Abduction ER Base Ext/IR or hand behind back level   Left WFL WFL WFL T6   Right WFL WFL WFL T10   Pain: end range flexion; near 90 deg abduction, and with reaching behind back. Pain mostly in anterior shoulder     JOINT MOBILITY  Hypomobile in posterior capsule mobility at 90 deg abduction     PROM Flexion Abd 90-90 ER 90-90 IR Scap Stabilized   Left WFL WFL  WFL WFL   Right WFL WFL 90+ 26    GH indicates pure glenohumeral ROM    SHOULDER STRENGTH  MMT Flexion Scaption ER IR   Left 5/5 5/5 5/5 5/5   Right 4/5 4/5 4/5 4/5       SPECIAL  TESTS      L R   Impingement       Neer's - +   Hawkin's-Emiliano - +   Coracoid Impingement - -   Internal impingement - -   Labral       Anterior Slide - -   [place]'s - -   Crank - -   Instability       Apprehension (anterior) - -   Relocation (anterior) - -   Anterior Load & Shift - -   Posterior Load & Shift - -   Posterior instability (with 90 degrees flex) - -   Multi-Directional Instability        Sulcus - -   Biceps        Speed's - -   Rotator Cuff Tear       Drop Arm - -   Belly Press - -   Lift off  - -       PALPATION  Left: No tenderness to palpation  Right: Moderate tenderness to palpation at distal triceps, upper trapezius, and posterior shoulder musculature. Also tender on anterior shoulder around biceps attachment    System    Physical Exam    General     ROS    Assessment/Plan:    Patient is a 36 year old female with right side shoulder complaints. Patient demonstrates mobility restrictions with posterior rotator cuff with IR and horizontal abduction. Has significant pec and upper trapezius tightness as well as noted muscle weakness. Numbness can be recreated in C6 nerve distribution when stretch is increased but not with cervical motions.        Patient has the following significant findings with corresponding treatment plan.                Diagnosis 1:  Chronic right shoulder pain  Pain -  hot/cold therapy, electric stimulation, mechanical traction and manual therapy  Decreased ROM/flexibility - manual therapy and therapeutic exercise  Decreased joint mobility - manual therapy and therapeutic exercise  Decreased strength - therapeutic exercise and therapeutic activities  Impaired muscle performance - neuro re-education  Decreased function - therapeutic activities    Therapy Evaluation Codes:     Cumulative Therapy Evaluation is: Low complexity.    Previous and current functional limitations:  (See Goal Flow Sheet for this information)    Short term and Long term goals: (See Goal Flow Sheet for  this information)     Communication ability:  Patient appears to be able to clearly communicate and understand verbal and written communication and follow directions correctly.  Treatment Explanation - The following has been discussed with the patient:   RX ordered/plan of care  Anticipated outcomes  Possible risks and side effects  This patient would benefit from PT intervention to resume normal activities.   Rehab potential is excellent.    Frequency:  1-2 X week, once daily  Duration:  for 8 weeks  Discharge Plan:  Achieve all LTG.  Independent in home treatment program.  Reach maximal therapeutic benefit.    Please refer to the daily flowsheet for treatment today, total treatment time and time spent performing 1:1 timed codes.

## 2022-01-21 ENCOUNTER — THERAPY VISIT (OUTPATIENT)
Dept: PHYSICAL THERAPY | Facility: CLINIC | Age: 37
End: 2022-01-21
Payer: OTHER MISCELLANEOUS

## 2022-01-21 DIAGNOSIS — M25.511 RIGHT SHOULDER PAIN: Primary | ICD-10-CM

## 2022-01-21 PROCEDURE — 97161 PT EVAL LOW COMPLEX 20 MIN: CPT | Mod: GP

## 2022-01-21 PROCEDURE — 97110 THERAPEUTIC EXERCISES: CPT | Mod: GP

## 2022-01-21 NOTE — LETTER
[facility]  [address]  [place] [address]  [phone]    2022    Re: Prudence iMtchell   :   1985  MRN:  [id number]   REFERRING PHYSICIAN:   Terrie FERREIRA [facility]    Date of Initial Evaluation:  22  Visits:  Rxs Used: 1  Reason for Referral:  Right shoulder pain    EVALUATION SUMMARY    Physical Therapy Initial Examination/Evaluation  2022    Therapist Impression: Prudence is a 36 year old year old female referred to physical therapy by Terrie Gonzales MD for treatment of chronic right shoulder pain. Patient presents to physical therapy with c/o right arm pain and numbness. Signs and symptoms are consistent with shoulder impingement with radicular numbness in C6 distribution. Due to these impairments, patient is unable to perform full work duties, lift greater than 30 pounds or sleep comfortably on right side. Patient will benefit from skilled PT in order to address impairments/limitations in order to return patient to goals and prior level of function.     MD Order: 1-2x/wk for 6 weeks for strength, ROM, pain control     Subjective:  Presenting Complaint Right shoulder pain   Mechanism of Injury  Fall, slipped and fell on elbow   DOI (onset)/ DOS 2021   Functional Limitations Sleeping, lifting, carrying    Notable PMH See Epic chart    Prior treatment PT at this location prior   good   Prior Imaging  x-ray- negative findings no fractures        Pain/Presentation    Pain Level   Rest: /10  ; Activity: 3/10   Location   right shoulder; numbness into thumb and pointer when waking up    Frequency Intermittent   Described as aching, dull and sharp, numbness    Alleviated by  Rest   Progression of Sx Gradually getting better.   Time of Day  Activity related and Position related   Sleeping  Interrupted due to current issue; sleeping on the right side is tender       Social  Factors/Lifestyle  Occupation and Duties [employer]   prolonged sitting, lifting/carrying, pushing/pulling, driving  -No more than 30#    Barriers at home/work None as reported by patient   Medications Anti-inflammatory (800 mg in morning and repeat as needed)    Current HEP/Exercise Unable to play volleyball since fall; running on treadmill    Patient Reported Health Excellent      Patient Goals:  1) back to work at full capacity   2) volleyball as able   3) able to sleep on right side without issue     Other factors/PMH that may impact care: none    The history is provided by the patient.     Patient Health History  Prudence Mitchell being seen for right arm injury.   Problem began: 12/16/2021.   Problem occurred: from a fall    Pain is reported as 1/10 on pain scale.  General health as reported by patient is excellent.  Pertinent medical history includes: asthma, high blood pressure and numbness/tingling.   Other surgery history details: hernia repair .    Current medications:  High blood pressure medication. Other medications details: birth control .    Current occupation is / .   Primary job tasks include:  Computer work, lifting/carrying and pushing/pulling.     SHOULDER EXAMINATION  Handedness: Right    CERVICAL SCREEN  AROM: WNL/symmetrical ; tight with left lateral flexion with pulling and tightness in upper trap and right side of neck  -Notes numbness/tingling in thumb and pointer (C6) that occurs with laying on side or stretching tight muscles.     THORACIC SPINE SCREEN  WNL    STATIC POSTURE  Forward head: mild     Rounded shoulders:mild  Shoulder internally rotated: greater on R      KINEMATIC SCAPULAR ASSESSMENT  General Dynamic Scapular Assessment: No obvious findings    SHOULDER RANGE OF MOTION  AROM Flexion Abduction ER Base Ext/IR or hand behind back level   Left WFL WFL WFL T6   Right WFL WFL WFL T10   Pain: end range flexion; near 90 deg abduction, and with  reaching behind back. Pain mostly in anterior shoulder     JOINT MOBILITY  Hypomobile in posterior capsule mobility at 90 deg abduction     PROM Flexion Abd 90-90 ER 90-90 IR Scap Stabilized   Left WFL WFL  WFL WFL   Right WFL WFL 90+ 26    GH indicates pure glenohumeral ROM    SHOULDER STRENGTH  MMT Flexion Scaption ER IR   Left 5/5 5/5 5/5 5/5   Right 4/5 4/5 4/5 4/5     SPECIAL TESTS      L R   Impingement       Neer's - +   Hawkin's-Emiliano - +   Coracoid Impingement - -   Internal impingement - -   Labral       Anterior Slide - -   Sebastian's - -   Crank - -   Instability       Apprehension (anterior) - -   Relocation (anterior) - -   Anterior Load & Shift - -   Posterior Load & Shift - -   Posterior instability (with 90 degrees flex) - -   Multi-Directional Instability        Sulcus - -   Biceps        Speed's - -   Rotator Cuff Tear       Drop Arm - -   Belly Press - -   Lift off  - -       PALPATION  Left: No tenderness to palpation  Right: Moderate tenderness to palpation at distal triceps, upper trapezius, and posterior shoulder musculature. Also tender on anterior shoulder around biceps attachment    Assessment/Plan:    Patient is a 36 year old female with right side shoulder complaints. Patient demonstrates mobility restrictions with posterior rotator cuff with IR and horizontal abduction. Has significant pec and upper trapezius tightness as well as noted muscle weakness. Numbness can be recreated in C6 nerve distribution when stretch is increased but not with cervical motions.    Patient has the following significant findings with corresponding treatment plan.                Diagnosis 1:  Chronic right shoulder pain  Pain -  hot/cold therapy, electric stimulation, mechanical traction and manual therapy  Decreased ROM/flexibility - manual therapy and therapeutic exercise  Decreased joint mobility - manual therapy and therapeutic exercise  Decreased strength - therapeutic exercise and therapeutic  activities  Impaired muscle performance - neuro re-education  Decreased function - therapeutic activities    Therapy Evaluation Codes:     Cumulative Therapy Evaluation is: Low complexity.  Previous and current functional limitations:  (See Goal Flow Sheet for this information)    Short term and Long term goals: (See Goal Flow Sheet for this information)   Communication ability:  Patient appears to be able to clearly communicate and understand verbal and written communication and follow directions correctly.  Treatment Explanation - The following has been discussed with the patient:   RX ordered/plan of care  Anticipated outcomes  Possible risks and side effects  This patient would benefit from PT intervention to resume normal activities.   Rehab potential is excellent.    Frequency:  1-2 X week, once daily  Duration:  for 8 weeks  Discharge Plan:  Achieve all LTG.  Independent in home treatment program.  Reach maximal therapeutic benefit.    Thank you for your referral.    INQUIRIES  Therapist: JOSE L ARNOLD DPT  [facility] [place] [facility]  [address]  [place] [address]  Phone: [phone]  Fax: [phone]

## 2022-01-25 ENCOUNTER — THERAPY VISIT (OUTPATIENT)
Dept: PHYSICAL THERAPY | Facility: CLINIC | Age: 37
End: 2022-01-25
Payer: OTHER MISCELLANEOUS

## 2022-01-25 DIAGNOSIS — M25.511 RIGHT SHOULDER PAIN: ICD-10-CM

## 2022-01-25 PROCEDURE — 97110 THERAPEUTIC EXERCISES: CPT | Mod: GP | Performed by: PHYSICAL THERAPIST

## 2022-01-28 ENCOUNTER — TELEPHONE (OUTPATIENT)
Dept: PHYSICAL THERAPY | Facility: CLINIC | Age: 37
End: 2022-01-28
Payer: COMMERCIAL

## 2022-02-14 ENCOUNTER — THERAPY VISIT (OUTPATIENT)
Dept: PHYSICAL THERAPY | Facility: CLINIC | Age: 37
End: 2022-02-14
Payer: OTHER MISCELLANEOUS

## 2022-02-14 DIAGNOSIS — M25.511 RIGHT SHOULDER PAIN: ICD-10-CM

## 2022-02-14 PROCEDURE — 97140 MANUAL THERAPY 1/> REGIONS: CPT | Mod: GP | Performed by: PHYSICAL THERAPIST

## 2022-02-14 PROCEDURE — 97110 THERAPEUTIC EXERCISES: CPT | Mod: GP | Performed by: PHYSICAL THERAPIST

## 2022-02-14 PROCEDURE — 97010 HOT OR COLD PACKS THERAPY: CPT | Mod: GP | Performed by: PHYSICAL THERAPIST

## 2022-02-23 ENCOUNTER — THERAPY VISIT (OUTPATIENT)
Dept: PHYSICAL THERAPY | Facility: CLINIC | Age: 37
End: 2022-02-23
Payer: OTHER MISCELLANEOUS

## 2022-02-23 DIAGNOSIS — M25.511 RIGHT SHOULDER PAIN: ICD-10-CM

## 2022-02-23 PROCEDURE — 97110 THERAPEUTIC EXERCISES: CPT | Mod: GP | Performed by: PHYSICAL THERAPIST

## 2022-02-23 PROCEDURE — 97140 MANUAL THERAPY 1/> REGIONS: CPT | Mod: GP | Performed by: PHYSICAL THERAPIST

## 2022-03-04 ENCOUNTER — THERAPY VISIT (OUTPATIENT)
Dept: PHYSICAL THERAPY | Facility: CLINIC | Age: 37
End: 2022-03-04
Payer: OTHER MISCELLANEOUS

## 2022-03-04 DIAGNOSIS — M25.511 RIGHT SHOULDER PAIN: ICD-10-CM

## 2022-03-04 PROCEDURE — 97110 THERAPEUTIC EXERCISES: CPT | Mod: GP | Performed by: PHYSICAL THERAPIST

## 2022-03-04 PROCEDURE — 97140 MANUAL THERAPY 1/> REGIONS: CPT | Mod: GP | Performed by: PHYSICAL THERAPIST

## 2022-03-04 NOTE — PROGRESS NOTES
Subjective:  HPI  Physical Exam                    Objective:  System    Physical Exam    General     ROS    Assessment/Plan:    PROGRESS  REPORT    Progress reporting period is from 1/21/22 to 3/4/22.     SUBJECTIVE  Subjective: pt reports she has played volleyball three times since last session without pain, but two days latera had some R low trap pulsating that was new and unusual.   Current Pain level: 0/10       Changes in function: Yes, see goal flow sheet for change in function   Adverse reactions: None, Treatment:;   ,     The objective findings are from DOS 3/4/22.    Shoulder Pain and Disability Index(SPADI): 6.92    OBJECTIVE  Objective: full painfree B AROM, normal 5/5 strength. soft tissue tightness in R>L scalenes, upper trap, levator scapula, 1st rib hypomobility. TTP R lower trap.      ASSESSMENT/PLAN  Updated problem list and treatment plan: Diagnosis 1:  R shoulder pain  STG/LTGs have been met or progress has been made towards goals: no change  Assessment of Progress: The patient's condition has potential to improve.  Self Management Plans:  Patient has been instructed in a home treatment program.  I have re-evaluated this patient and find that the nature, scope, duration and intensity of the therapy is appropriate for the medical condition of the patient.  Prudence continues to require the following intervention to meet STG and LTG's: PT    Recommendations:  Emre has been seen for 5 PT visits for R shoulder pain with overall modest improvement. She has normal ROM and near full strength and is playing volleyball again with minimal to no pain. Currently she would benefit from continued PT for R shoulder stabilization and strength as she returns to full volleyball participation/competition.    Please refer to the daily flowsheet for treatment today, total treatment time and time spent performing 1:1 timed codes.

## 2022-03-21 ENCOUNTER — THERAPY VISIT (OUTPATIENT)
Dept: PHYSICAL THERAPY | Facility: CLINIC | Age: 37
End: 2022-03-21
Payer: OTHER MISCELLANEOUS

## 2022-03-21 DIAGNOSIS — M25.511 RIGHT SHOULDER PAIN: ICD-10-CM

## 2022-03-21 PROCEDURE — 97140 MANUAL THERAPY 1/> REGIONS: CPT | Mod: GP | Performed by: PHYSICAL THERAPIST

## 2022-03-21 PROCEDURE — 97010 HOT OR COLD PACKS THERAPY: CPT | Mod: GP | Performed by: PHYSICAL THERAPIST

## 2022-03-21 PROCEDURE — 97110 THERAPEUTIC EXERCISES: CPT | Mod: GP | Performed by: PHYSICAL THERAPIST

## 2022-03-25 ENCOUNTER — THERAPY VISIT (OUTPATIENT)
Dept: PHYSICAL THERAPY | Facility: CLINIC | Age: 37
End: 2022-03-25
Payer: OTHER MISCELLANEOUS

## 2022-03-25 DIAGNOSIS — M25.511 RIGHT SHOULDER PAIN: ICD-10-CM

## 2022-03-25 PROCEDURE — 97110 THERAPEUTIC EXERCISES: CPT | Mod: GP | Performed by: PHYSICAL THERAPIST

## 2022-03-25 PROCEDURE — 97012 MECHANICAL TRACTION THERAPY: CPT | Mod: GP | Performed by: PHYSICAL THERAPIST

## 2022-03-29 ENCOUNTER — THERAPY VISIT (OUTPATIENT)
Dept: PHYSICAL THERAPY | Facility: CLINIC | Age: 37
End: 2022-03-29
Payer: OTHER MISCELLANEOUS

## 2022-03-29 DIAGNOSIS — M25.511 RIGHT SHOULDER PAIN: ICD-10-CM

## 2022-03-29 PROCEDURE — 97110 THERAPEUTIC EXERCISES: CPT | Mod: GP | Performed by: PHYSICAL THERAPIST

## 2022-03-29 PROCEDURE — 97012 MECHANICAL TRACTION THERAPY: CPT | Mod: GP | Performed by: PHYSICAL THERAPIST

## 2022-04-05 ENCOUNTER — THERAPY VISIT (OUTPATIENT)
Dept: PHYSICAL THERAPY | Facility: CLINIC | Age: 37
End: 2022-04-05
Payer: OTHER MISCELLANEOUS

## 2022-04-05 DIAGNOSIS — M25.511 RIGHT SHOULDER PAIN: ICD-10-CM

## 2022-04-05 PROCEDURE — 97012 MECHANICAL TRACTION THERAPY: CPT | Mod: GP | Performed by: PHYSICAL THERAPIST

## 2022-04-05 PROCEDURE — 97110 THERAPEUTIC EXERCISES: CPT | Mod: GP | Performed by: PHYSICAL THERAPIST

## 2022-04-07 ENCOUNTER — THERAPY VISIT (OUTPATIENT)
Dept: PHYSICAL THERAPY | Facility: CLINIC | Age: 37
End: 2022-04-07
Payer: OTHER MISCELLANEOUS

## 2022-04-07 DIAGNOSIS — M25.511 RIGHT SHOULDER PAIN: ICD-10-CM

## 2022-04-07 PROCEDURE — 97110 THERAPEUTIC EXERCISES: CPT | Mod: GP | Performed by: PHYSICAL THERAPIST

## 2022-04-07 PROCEDURE — 97012 MECHANICAL TRACTION THERAPY: CPT | Mod: GP | Performed by: PHYSICAL THERAPIST

## 2022-04-12 ENCOUNTER — THERAPY VISIT (OUTPATIENT)
Dept: PHYSICAL THERAPY | Facility: CLINIC | Age: 37
End: 2022-04-12
Payer: OTHER MISCELLANEOUS

## 2022-04-12 DIAGNOSIS — M25.511 RIGHT SHOULDER PAIN: Primary | ICD-10-CM

## 2022-04-12 PROCEDURE — 97010 HOT OR COLD PACKS THERAPY: CPT | Mod: GP | Performed by: PHYSICAL THERAPIST

## 2022-04-12 PROCEDURE — 97012 MECHANICAL TRACTION THERAPY: CPT | Mod: GP | Performed by: PHYSICAL THERAPIST

## 2022-04-12 PROCEDURE — 97110 THERAPEUTIC EXERCISES: CPT | Mod: GP | Performed by: PHYSICAL THERAPIST

## 2022-04-14 ENCOUNTER — THERAPY VISIT (OUTPATIENT)
Dept: PHYSICAL THERAPY | Facility: CLINIC | Age: 37
End: 2022-04-14
Payer: OTHER MISCELLANEOUS

## 2022-04-14 DIAGNOSIS — M25.511 RIGHT SHOULDER PAIN: Primary | ICD-10-CM

## 2022-04-14 PROCEDURE — 97010 HOT OR COLD PACKS THERAPY: CPT | Mod: GP | Performed by: PHYSICAL THERAPIST

## 2022-04-14 PROCEDURE — 97012 MECHANICAL TRACTION THERAPY: CPT | Mod: GP | Performed by: PHYSICAL THERAPIST

## 2022-04-14 PROCEDURE — 97110 THERAPEUTIC EXERCISES: CPT | Mod: GP | Performed by: PHYSICAL THERAPIST

## 2022-04-14 NOTE — LETTER
[facility]  [address] [place] [address]  [place] [address]  [phone]    2022    Re: Prudence Mccabe   :   1985  MRN:  [id number]   REFERRING PHYSICIAN:   Terrie FERREIRA [facility]    Date of Initial Evaluation:  3/4/22  Visits:  Rxs Used: 12  Reason for Referral:  Right shoulder pain    DISCHARGE REPORT  Progress reporting period is from 3/4/22 to 22.     SUBJECTIVE  Subjective: pt reports laying on R side is still the most troublesome movement and position. overall improvement but still no 100%   Current Pain level: 0/10   Initial Pain level: 310   The objective findings are from DOS 22.    Shoulder Pain and Disability Index(SPADI): 6.9 (no change since 3/4/22)    OBJECTIVE  Objective: full painfree BUE AROM, normal 5/5 strength with some discomfort in resisted elevation and abd, full can and empty can painfree and strong, B ER and IR normal 5/5      ASSESSMENT/PLAN  Updated problem list and treatment plan: Diagnosis 1:  R shoulder pain   STG/LTGs have been met or progress has been made towards goals:  Yes, lifting and sleeping  Assessment of Progress: The patient's condition has potential to improve.   Self Management Plans:  Patient has been instructed in a home treatment program.  Prudence continues to require the following intervention to meet STG and LTG's: PT intervention is no longer required to meet STG/LTG.  We will discharge this patient from PT.    Recommendations:  Emre has been seen for 12 visits for R shoulder pain demonstrating and reporting overall strength improvement, painfree AROM is full, traction has helped with RUE paresthesias in regard to frequency, intensity, and duration of symptoms. Overall function during volleyball and other activities is normal. However, she continues to have some pain during R sidelying which limits her sleep. Current plan is to continue PT program  on her own and allow more time for healing and we will discharge from formal PT at this time.    Thank you for your referral.    INQUIRIES  Therapist: Ramos Ayon DPT  [address]  [place] [address]  Phone: [phone]  Fax: [phone]

## 2022-04-14 NOTE — PROGRESS NOTES
Subjective:  HPI  Physical Exam                    Objective:  System    Physical Exam    General     ROS    Assessment/Plan:    DISCHARGE REPORT    Progress reporting period is from 3/4/22 to 4/14/22.     SUBJECTIVE  Subjective: pt reports laying on R side is still the most troublesome movement and position. overall improvement but still no 100%   Current Pain level: 0/10   Initial Pain level: 3/10   The objective findings are from DOS 4/14/22.    Shoulder Pain and Disability Index(SPADI): 6.9 (no change since 3/4/22)    OBJECTIVE  Objective: full painfree BUE AROM, normal 5/5 strength with some discomfort in resisted elevation and abd, full can and empty can painfree and strong, B ER and IR normal 5/5      ASSESSMENT/PLAN  Updated problem list and treatment plan: Diagnosis 1:  R shoulder pain   STG/LTGs have been met or progress has been made towards goals:  Yes, lifting and sleeping  Assessment of Progress: The patient's condition has potential to improve.   Self Management Plans:  Patient has been instructed in a home treatment program.  Prudence continues to require the following intervention to meet STG and LTG's: PT intervention is no longer required to meet STG/LTG.  We will discharge this patient from PT.    Recommendations:  Emre has been seen for 12 visits for R shoulder pain demonstrating and reporting overall strength improvement, painfree AROM is full, traction has helped with RUE paresthesias in regard to frequency, intensity, and duration of symptoms. Overall function during volleyball and other activities is normal. However, she continues to have some pain during R sidelying which limits her sleep. Current plan is to continue PT program on her own and allow more time for healing and we will discharge from formal PT at this time.    Please refer to the daily flowsheet for treatment today, total treatment time and time spent performing 1:1 timed codes.

## 2023-04-20 ENCOUNTER — TRANSCRIBE ORDERS (OUTPATIENT)
Dept: OTHER | Age: 38
End: 2023-04-20

## 2023-04-20 DIAGNOSIS — R20.0 NUMBNESS AND TINGLING OF RIGHT ARM: Primary | ICD-10-CM

## 2023-04-20 DIAGNOSIS — R20.2 NUMBNESS AND TINGLING OF RIGHT ARM: Primary | ICD-10-CM

## 2023-05-17 ENCOUNTER — THERAPY VISIT (OUTPATIENT)
Dept: PHYSICAL THERAPY | Facility: CLINIC | Age: 38
End: 2023-05-17
Payer: OTHER MISCELLANEOUS

## 2023-05-17 DIAGNOSIS — R20.0 NUMBNESS AND TINGLING OF RIGHT ARM: ICD-10-CM

## 2023-05-17 DIAGNOSIS — R20.2 NUMBNESS AND TINGLING OF RIGHT ARM: ICD-10-CM

## 2023-05-17 PROCEDURE — 97110 THERAPEUTIC EXERCISES: CPT | Mod: GP | Performed by: PHYSICAL THERAPIST

## 2023-05-17 NOTE — PROGRESS NOTES
PHYSICAL THERAPY EVALUATION  Type of Visit: Evaluation    See electronic medical record for Abuse and Falls Screening details.    Subjective      Presenting condition or subjective complaint:  RUE tingling/numbness  Date of onset:    23  Relevant medical history:   HTN, asthma, numbness tingling  Dates & types of surgery:  hernia, hysterectomy    Prior diagnostic imaging/testing results:     see below for MRI details  Prior therapy history for the same diagnosis, illness or injury:    previous R shoulder and neck therapy      Employment: [employer]  Hobbies/Interests: volleyball    Patient goals for therapy:  play volleyball and improved sleep    Pain assessment: Location: R cervical/Ratin/10     Objective   CERVICAL SPINE EVALUATION  PAIN: 4/10 R neck pain    POSTURE: WNL    ROM:   (Degrees) Left AROM Right AROM    Cervical Flexion 1finger    Cervical Extension 50degs    Cervical Side bend 29degs 25degs    Cervical rotation 65degs 70degs    Cervical Protrusion     Cervical Retraction Increased symptoms distally    Thoracic Flexion     Thoracic Extension     Thoracic Rotation       Left AROM Left PROM Right AROM Right PROM   Shoulder Flexion wnl  wnl    Shoulder Extension wnl  wnl    Shoulder Abduction wnl  wnl    Shoulder Adduction       Shoulder IR       Shoulder ER wnl  wnl    Shoulder Horiz Abduction       Shoulder Horiz Adduction       Pain: some irritation in R ACJ due to reported distal clavicle fracture, known R bicep tendon tear and labral tear  End Feel:     MYOTOMES: WNL  DTR S: nt   DERMATOMES: normal light touch sensation BUE  NEURAL TENSION: NT  FLEXIBILITY:  Soft tissue tightness in B UT, LS, mid scalenes, C3-6 hypomobility  Special Tests:    Left Right   Alar Ligament Negative    Cervical Flexion-Rotation     Cervical Rot/Lateral Flex     Compression     Distraction     Spurling s Positive Positive   Thoracic Outlet Screen (Vera, Adson)     Transverse Ligament Negative  Negative     Vertebral Artery     Cotton Roll Test     Craniocervical Flexor Endurance Test     Mannheimer Test                  Cervical MRI 6/29/22:  FINDINGS:   Straightening of usual cervical lordosis. Cervical vertebral body heights are maintained. Grade 1 retrolisthesis of C5 on C6 and C6 on C7. The cervical spinal canal is mildly narrowed on a developmental basis. Focally prominent central canal at the level of the C5 and C6 vertebral bodies. No extraspinal abnormality.    IMPRESSION:   1.  Mild cervical spondylitic changes superimposed on developmental narrowing of the cervical spinal canal. There is no significant cervical spinal canal stenosis.   2.  Multilevel neural foraminal stenosis due to uncovertebral spurring and degenerative facet changes including severe bilateral neural foraminal stenosis at C4-C5 and C6-C7 and severe right neural foraminal stenosis at C5-C6.          Assessment & Plan   CLINICAL IMPRESSIONS   Medical Diagnosis:    numbness and tingling of R arm  Treatment Diagnosis:   cervical radiculopathy  Impression/Assessment: Patient is a 38 year old female with cervical radiculopathy complaints.  The following significant findings have been identified: Pain, Decreased ROM/flexibility and Decreased joint mobility. These impairments interfere with their ability to perform sleep and recreational activity as compared to previous level of function.     Clinical Decision Making (Complexity):   Clinical Presentation: Stable/Uncomplicated  Clinical Presentation Rationale: based on medical and personal factors listed in PT evaluation  Clinical Decision Making (Complexity): Low complexity    PLAN OF CARE  Treatment Interventions:  Modalities: Cryotherapy, E-stim, Hot Pack  Interventions: Manual Therapy, Neuromuscular Re-education, Therapeutic Activity, Therapeutic Exercise    Long Term Goals     Full night sleep uninterrupted by neck  painfree volleyball      Frequency of Treatment: 1x/wk  Duration of  Treatment: 12wks    Recommended Referrals to Other Professionals: Physical Therapy  Education Assessment:        Risks and benefits of evaluation/treatment have been explained.   Patient/Family/caregiver agrees with Plan of Care.     Evaluation Time: 15         Signing Clinician: Ramos Ayon PT

## 2023-05-31 ENCOUNTER — THERAPY VISIT (OUTPATIENT)
Dept: PHYSICAL THERAPY | Facility: CLINIC | Age: 38
End: 2023-05-31
Payer: OTHER MISCELLANEOUS

## 2023-05-31 DIAGNOSIS — M54.12 CERVICAL RADICULOPATHY: Primary | ICD-10-CM

## 2023-05-31 PROCEDURE — 97140 MANUAL THERAPY 1/> REGIONS: CPT | Mod: GP | Performed by: PHYSICAL THERAPIST

## 2023-05-31 PROCEDURE — 97112 NEUROMUSCULAR REEDUCATION: CPT | Mod: GP | Performed by: PHYSICAL THERAPIST

## 2023-05-31 PROCEDURE — 97110 THERAPEUTIC EXERCISES: CPT | Mod: GP | Performed by: PHYSICAL THERAPIST

## 2023-06-07 ENCOUNTER — THERAPY VISIT (OUTPATIENT)
Dept: PHYSICAL THERAPY | Facility: CLINIC | Age: 38
End: 2023-06-07
Payer: OTHER MISCELLANEOUS

## 2023-06-07 DIAGNOSIS — M54.12 CERVICAL RADICULOPATHY: Primary | ICD-10-CM

## 2023-06-07 PROCEDURE — 97140 MANUAL THERAPY 1/> REGIONS: CPT | Mod: GP | Performed by: PHYSICAL THERAPIST

## 2023-06-07 PROCEDURE — 97110 THERAPEUTIC EXERCISES: CPT | Mod: GP | Performed by: PHYSICAL THERAPIST

## 2023-06-07 PROCEDURE — 97112 NEUROMUSCULAR REEDUCATION: CPT | Mod: GP | Performed by: PHYSICAL THERAPIST

## 2023-06-14 ENCOUNTER — THERAPY VISIT (OUTPATIENT)
Dept: PHYSICAL THERAPY | Facility: CLINIC | Age: 38
End: 2023-06-14
Payer: OTHER MISCELLANEOUS

## 2023-06-14 DIAGNOSIS — M54.12 CERVICAL RADICULOPATHY: Primary | ICD-10-CM

## 2023-06-14 PROCEDURE — 97140 MANUAL THERAPY 1/> REGIONS: CPT | Mod: GP | Performed by: PHYSICAL THERAPIST

## 2023-06-14 PROCEDURE — 97110 THERAPEUTIC EXERCISES: CPT | Mod: GP | Performed by: PHYSICAL THERAPIST

## 2023-06-14 NOTE — PROGRESS NOTES
06/14/23 0500   Appointment Info   Signing clinician's name / credentials Ramos Ayon DPT   Total/Authorized Visits 12   Visits Used 4   Medical Diagnosis cervical radiculopathy   PT Tx Diagnosis cervical radiculopathy   Other pertinent information chronic neck and R shoulder pain   Progress Note/Certification   Onset of illness/injury or Date of Surgery 04/20/23   Therapy Frequency 1x/wk   Predicted Duration 12wks   Progress Note Due Date 07/12/23   Progress Note Completed Date 06/14/23   PT Goal 1   Goal Identifier sleep   Goal Description pt will be able to sleep 7hours painfree   Rationale to maximize safety and independence with performance of ADLs and functional tasks   Goal Progress 7hours with intermittently pain and numbness especially on R side   Target Date 08/09/23   PT Goal 2   Goal Identifier sport   Goal Description pt will be able to play volleyball with <3/10 pain   Rationale to maximize safety and independence within the community   Target Date 08/09/23   Goal Progress hasn't played for the last 3 weeks.   Subjective Report   Subjective Report pt reports overall feeling good, had injection into R shoulder but admits having some numbness into L bicep intermittently but fairly new. occurs a couple times per day and she will do some neck movements that resolve.   Objective Measures   Objective Measures Objective Measure 1;Objective Measure 2;Objective Measure 3   Objective Measure 1   Objective Measure AROM cervical   Details ext: 48degs with some LUE symptoms flx:2fingers SB L:35degs  R:33degs rotation L:70degs R:71degs wtih some LUE symptoms   Objective Measure 2   Objective Measure spurling's   Details + on L to L bicep   Objective Measure 3   Objective Measure segmental mobilty   Details hypomobility L>R C5-7, L>R 1st rib hypomobility   Therapeutic Procedure/Exercise   Therapeutic Procedures: strength, endurance, ROM, flexibillity minutes (26744) 15   Ther Proc 1 UBE 6mins with some mild  BUE t/n at the end   Ther Proc 2 shrug 8# [id number] BUE with some LUE paresthesias and terminated   Ther Proc 3 GTB adduction [id number] each with relief   Manual Therapy   Manual Therapy: Mobilization, MFR, MLD, friction massage minutes (53978) 10   Manual Therapy 1 C2-6 sideglides with some hypomobility in L upper cervical and R lower cervical   Manual Therapy 2 intermittent distraction with relief   Manual Therapy 3 brief 1st rib inferior mobs   Plan   Plan for next session next prone scap progression, sidelying shoulder flx   Total Session Time   Timed Code Treatment Minutes 25   Total Treatment Time (sum of timed and untimed services) 25     NDI: 10 (improved from 18 on 5/17/23)    PLAN  Pt has been seen for 4 PT visits for neck pain with some modest improvement. She continues to have irritation with certain positions causing LUE symptoms following C5 dermatome/myotome that is relief quickly with change on neck position or posture. Current plan is to continue PT 1x/wk x8wks.    Beginning/End Dates of Progress Note Reporting Period:  5/17/23 to 06/14/2023    Referring Provider:  Seble Peña

## 2023-06-20 ENCOUNTER — THERAPY VISIT (OUTPATIENT)
Dept: PHYSICAL THERAPY | Facility: CLINIC | Age: 38
End: 2023-06-20
Payer: OTHER MISCELLANEOUS

## 2023-06-20 DIAGNOSIS — M54.12 CERVICAL RADICULOPATHY: Primary | ICD-10-CM

## 2023-06-20 PROCEDURE — 97010 HOT OR COLD PACKS THERAPY: CPT | Mod: GP | Performed by: PHYSICAL THERAPIST

## 2023-06-20 PROCEDURE — 97110 THERAPEUTIC EXERCISES: CPT | Mod: GP | Performed by: PHYSICAL THERAPIST

## 2023-06-20 PROCEDURE — 97140 MANUAL THERAPY 1/> REGIONS: CPT | Mod: GP | Performed by: PHYSICAL THERAPIST

## 2023-06-20 PROCEDURE — 97112 NEUROMUSCULAR REEDUCATION: CPT | Mod: GP | Performed by: PHYSICAL THERAPIST

## 2023-07-07 ENCOUNTER — THERAPY VISIT (OUTPATIENT)
Dept: PHYSICAL THERAPY | Facility: CLINIC | Age: 38
End: 2023-07-07
Payer: OTHER MISCELLANEOUS

## 2023-07-07 DIAGNOSIS — M54.12 CERVICAL RADICULOPATHY: Primary | ICD-10-CM

## 2023-07-07 PROCEDURE — 97110 THERAPEUTIC EXERCISES: CPT | Mod: GP | Performed by: PHYSICAL THERAPIST

## 2023-07-07 PROCEDURE — 97112 NEUROMUSCULAR REEDUCATION: CPT | Mod: GP | Performed by: PHYSICAL THERAPIST

## 2023-07-07 PROCEDURE — 97010 HOT OR COLD PACKS THERAPY: CPT | Mod: GP | Performed by: PHYSICAL THERAPIST

## 2023-07-07 PROCEDURE — 97140 MANUAL THERAPY 1/> REGIONS: CPT | Mod: GP | Performed by: PHYSICAL THERAPIST

## 2023-07-13 ENCOUNTER — THERAPY VISIT (OUTPATIENT)
Dept: PHYSICAL THERAPY | Facility: CLINIC | Age: 38
End: 2023-07-13
Payer: OTHER MISCELLANEOUS

## 2023-07-13 DIAGNOSIS — M54.12 CERVICAL RADICULOPATHY: Primary | ICD-10-CM

## 2023-07-13 PROCEDURE — 97010 HOT OR COLD PACKS THERAPY: CPT | Mod: GP | Performed by: PHYSICAL THERAPIST

## 2023-07-13 PROCEDURE — 97140 MANUAL THERAPY 1/> REGIONS: CPT | Mod: GP | Performed by: PHYSICAL THERAPIST

## 2023-07-13 PROCEDURE — 97112 NEUROMUSCULAR REEDUCATION: CPT | Mod: GP | Performed by: PHYSICAL THERAPIST

## 2023-07-13 NOTE — PROGRESS NOTES
07/13/23 0500   Appointment Info   Signing clinician's name / credentials Ramos Ayon DPT   Total/Authorized Visits 12(10authorized per WC)   Visits Used 7   Medical Diagnosis cervical radiculopathy   PT Tx Diagnosis cervical radiculopathy   Other pertinent information chronic neck and R shoulder pain   Progress Note/Certification   Onset of illness/injury or Date of Surgery 04/20/23   Therapy Frequency 1x/wk   Predicted Duration 12wks   Progress Note Due Date 09/07/23   Progress Note Completed Date 07/13/23   PT Goal 1   Goal Identifier sleep   Goal Description pt will be able to sleep 7hours painfree   Rationale to maximize safety and independence with performance of ADLs and functional tasks   Goal Progress 7-8hours without waking from LUE symptoms   Target Date 08/09/23   Date Met 07/07/23   PT Goal 2   Goal Identifier sport   Goal Description pt will be able to play volleyball with <3/10 pain   Rationale to maximize safety and independence within the community   Goal Progress still hasn't played volleyball   Target Date 08/09/23   Subjective Report   Subjective Report pt reports feeling tightness in base of skull that resolves somewhat with massage but is challenging to do with shoulders. feels the numbness/tingling not as much but still at least once a week. admits she was very sore after doing frisbee golf   Objective Measure 1   Objective Measure cervical AROM   Details ext: 50degs with LUE symptoms flx: 2fingers SB L: 33degs R: 30degs rotation L: 68degs R: 68degs with LUE symptoms   Objective Measure 2   Objective Measure special tests   Details spurling's + L shoulder   Objective Measure 3   Objective Measure segmental mobility   Details hypomobility in L>R C3-6, hypomobility in 1st rib, soft tissue tightness in B upper trapezius, levator scapulaes, hypersensitivity in sub occiput   Hot Pack   Hot Pack Minutes (50893) 15   Treatment Detail cervical and L shoulder in supine with legs elevated on  ashli   Therapeutic Procedure/Exercise   Therapeutic Procedures: strength, endurance, ROM, flexibillity minutes (06102) 6   Ther Proc 1 UBE 6mins with some mild BUE t/n at the end   Neuromuscular Re-education   Neuromuscular re-ed of mvmt, balance, coord, kinesthetic sense, posture, proprioception minutes (29783) 8   Neuro Re-ed 1 prone I, W, T, Y [id number] each over R swiss ball   Manual Therapy   Manual Therapy: Mobilization, MFR, MLD, friction massage minutes (02079) 8   Manual Therapy 1 C2-6 sideglides with some hypomobility in L upper cervical and R lower cervical   Manual Therapy 2 intermittent distraction with some LUE symptoms today despite modifying position in regards to passive sidebending and rotation and terminated   Plan   Plan for next session next, progress strength, nothing new   Total Session Time   Timed Code Treatment Minutes 22   Total Treatment Time (sum of timed and untimed services) 37         PLAN  Emre has been seen for 7 PT visits for neck pain and radiculopathy with modest improvements. Her paresthesias have improved somewhat in frequency but intensity and duration seem to be about the same. Cervical ROM is near normal and fairly equal but certain positions produce LUE symptoms. Currently she would benefit from continued PT 1x/wk x5wks.    Beginning/End Dates of Progress Note Reporting Period:  6/14/23 to 07/13/2023    Referring Provider:  Seble Peña

## 2023-08-10 ENCOUNTER — THERAPY VISIT (OUTPATIENT)
Dept: PHYSICAL THERAPY | Facility: CLINIC | Age: 38
End: 2023-08-10
Payer: OTHER MISCELLANEOUS

## 2023-08-10 DIAGNOSIS — M54.12 CERVICAL RADICULOPATHY: Primary | ICD-10-CM

## 2023-08-10 PROCEDURE — 97140 MANUAL THERAPY 1/> REGIONS: CPT | Mod: GP | Performed by: PHYSICAL THERAPIST

## 2023-08-10 PROCEDURE — 97112 NEUROMUSCULAR REEDUCATION: CPT | Mod: GP | Performed by: PHYSICAL THERAPIST

## 2023-08-10 PROCEDURE — 97110 THERAPEUTIC EXERCISES: CPT | Mod: GP | Performed by: PHYSICAL THERAPIST

## 2023-08-10 PROCEDURE — 97010 HOT OR COLD PACKS THERAPY: CPT | Mod: GP | Performed by: PHYSICAL THERAPIST

## 2024-08-13 ENCOUNTER — TRANSCRIBE ORDERS (OUTPATIENT)
Dept: OTHER | Age: 39
End: 2024-08-13

## 2024-08-13 ENCOUNTER — MEDICAL CORRESPONDENCE (OUTPATIENT)
Dept: HEALTH INFORMATION MANAGEMENT | Facility: CLINIC | Age: 39
End: 2024-08-13
Payer: COMMERCIAL

## 2024-08-13 ENCOUNTER — TRANSFERRED RECORDS (OUTPATIENT)
Dept: HEALTH INFORMATION MANAGEMENT | Facility: CLINIC | Age: 39
End: 2024-08-13
Payer: COMMERCIAL

## 2024-08-13 DIAGNOSIS — R14.0 BLOATING: Primary | ICD-10-CM

## 2024-08-13 DIAGNOSIS — R10.12 LUQ PAIN: ICD-10-CM

## 2024-08-13 DIAGNOSIS — I77.4 CELIAC ARTERY COMPRESSION SYNDROME (H): ICD-10-CM

## 2024-08-14 ENCOUNTER — TRANSCRIBE ORDERS (OUTPATIENT)
Dept: OTHER | Age: 39
End: 2024-08-14

## 2024-08-14 DIAGNOSIS — R10.12 ABDOMINAL PAIN, LEFT UPPER QUADRANT: Primary | ICD-10-CM

## 2024-08-14 DIAGNOSIS — Z90.710 HX OF HYSTERECTOMY: ICD-10-CM

## 2024-08-14 DIAGNOSIS — R14.0 BLOATING: ICD-10-CM

## 2024-08-19 ENCOUNTER — TELEPHONE (OUTPATIENT)
Dept: OTHER | Facility: CLINIC | Age: 39
End: 2024-08-19
Payer: COMMERCIAL

## 2024-08-19 DIAGNOSIS — I77.4 MEDIAN ARCUATE LIGAMENT SYNDROME (H): Primary | ICD-10-CM

## 2024-08-19 NOTE — TELEPHONE ENCOUNTER
Referral received via Fax on 8/14/24.    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  Notes from Dr. Flores have been requested    Routing to scheduling to coordinate the following:  Mesenteric ultrasound with inspiratory and expiratory maneuvers  NEW VASCULAR PATIENT consult with Dr. 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 CTA at [facility]- Images have been requested. Notes from Dr. Flores have been requested    Nicol Moe RN on 8/19/2024 at 12:27 PM

## 2024-08-20 ENCOUNTER — TELEPHONE (OUTPATIENT)
Dept: GASTROENTEROLOGY | Facility: CLINIC | Age: 39
End: 2024-08-20

## 2024-08-20 NOTE — TELEPHONE ENCOUNTER
[facility] Call Center    Phone Message    May a detailed message be left on voicemail: Yes    Reason for Call: Other: Patient is currently scheduled on 11/07/2024, as visit type New GI Urgent. This is outside the expected timeline for this referral. Patient has been added to the waitlist.      Action Taken: Message routed to:  Other: GI REFERRAL TRIAGE POOL     Travel Screening: Not Applicable

## 2024-08-22 NOTE — TELEPHONE ENCOUNTER
Left voicemail for patient to schedule appointment(s), stated this our 2nd attempt at scheduling and provided [facility] telephone number for patient to call back to schedule.

## 2024-08-27 NOTE — TELEPHONE ENCOUNTER
Clinic Navigator sent a [employer] message to inform the Pt that Pt is on the wait list for a sooner appointment. Clinic Navigator will reach out to the Pt via phone call or [employer] when a sooner appointment becomes available.

## 2024-09-19 NOTE — TELEPHONE ENCOUNTER
Patient is scheduled for her Ultrasound on 09/24/24 and Consult Appointment with Dr De León on 10/08/24.

## 2024-09-24 ENCOUNTER — HOSPITAL ENCOUNTER (OUTPATIENT)
Dept: ULTRASOUND IMAGING | Facility: CLINIC | Age: 39
Discharge: HOME | End: 2024-09-24
Attending: INTERNAL MEDICINE | Admitting: INTERNAL MEDICINE
Payer: COMMERCIAL

## 2024-09-24 DIAGNOSIS — I77.4 MEDIAN ARCUATE LIGAMENT SYNDROME (H): ICD-10-CM

## 2024-09-24 PROCEDURE — 93975 VASCULAR STUDY: CPT

## 2024-10-08 ENCOUNTER — OFFICE VISIT (OUTPATIENT)
Dept: OTHER | Facility: CLINIC | Age: 39
End: 2024-10-08
Attending: INTERNAL MEDICINE
Payer: COMMERCIAL

## 2024-10-08 VITALS
DIASTOLIC BLOOD PRESSURE: 84 MMHG | HEART RATE: 70 BPM | OXYGEN SATURATION: 98 % | WEIGHT: 149.8 LBS | SYSTOLIC BLOOD PRESSURE: 121 MMHG

## 2024-10-08 DIAGNOSIS — I77.1: Primary | ICD-10-CM

## 2024-10-08 PROCEDURE — 99213 OFFICE O/P EST LOW 20 MIN: CPT | Performed by: INTERNAL MEDICINE

## 2024-10-08 PROCEDURE — G2211 COMPLEX E/M VISIT ADD ON: HCPCS | Performed by: INTERNAL MEDICINE

## 2024-10-08 PROCEDURE — 99205 OFFICE O/P NEW HI 60 MIN: CPT | Performed by: INTERNAL MEDICINE

## 2024-10-08 RX ORDER — AMLODIPINE BESYLATE 5 MG/1
5 TABLET ORAL DAILY
COMMUNITY

## 2024-10-08 RX ORDER — LOSARTAN POTASSIUM 25 MG/1
25 TABLET ORAL DAILY
COMMUNITY

## 2024-10-08 RX ORDER — MOMETASONE FUROATE MONOHYDRATE 50 UG/1
2 SPRAY, METERED NASAL DAILY
COMMUNITY
Start: 2024-08-13

## 2024-10-08 RX ORDER — ACYCLOVIR 400 MG/1
400 TABLET ORAL PRN
COMMUNITY

## 2024-10-08 RX ORDER — DEXTROAMPHETAMINE SULFATE, DEXTROAMPHETAMINE SACCHARATE, AMPHETAMINE SULFATE AND AMPHETAMINE ASPARTATE 5; 5; 5; 5 MG/1; MG/1; MG/1; MG/1
20 CAPSULE, EXTENDED RELEASE ORAL DAILY PRN
COMMUNITY

## 2024-10-08 RX ORDER — ACETAMINOPHEN 500 MG
1 TABLET ORAL DAILY
COMMUNITY

## 2024-10-08 NOTE — PROGRESS NOTES
INITIAL VASCULAR MEDICAL ASSESSMENT  REFERRAL SOURCE: Dr. 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 I explained to the patient that she does not have postprandial abdominal pain in the epigastric are, food fear, or an unintentional weight loss of > 10% of previous body weight, all of which are required to make the accurate dx of celiac artery compression syndrome. I explained to the patient that properly performed celiac artery decompression requires a  mini laparotomy in order to properly acces and divide the median arcuate ligament and to undertake a celiac ganglionectomy. I explained to the patient that we do not want to offer such a procedure to an individual with evidence of celiac artery compression who lacks the sxs suggestive of the true syndrome.     Plan: I advised the patient to keep her previously scheduled GI appointment on 11/07/2024 to consider direct luminal imaging with EGD, colonoscopy , and/or VCE. I also advised she discuss the feasibility of performing a gastric emptying study with her GI MD. Lastly, despite the fact that she mentions alternating constipation and loose stool, I do note that she lacks many of the other overlying sxs associated with IBS, so I doubt she truly has IBS despite alternating constipation and loose stool. We will ask that GI explore this possibility with her. She should RTC with us should she develop sxs of food fear, post prandial epigastric pain within 15 minutes of eating coupled with 10% weight loss.     The longitudinal care of plan for Emre was addressed during this visit. Due to added complexity of care, we will continue to support Emre and the subsequent  management of this condition and with ongoing continuity of care for this condition.     74 minutes total medical care on today's date.        HPI:     Prudence Mccabe is a 39 year old female employed as a  with a h/o endometriosis S/P KIMBERLY s BSO in 2023 complicated by postoperative infection treated with IV abx and subsequent full recovery from that infection. Since then she has had difficulty eating due to a sense of early satiety, and infrequent bowel movements associated with constipation.  She has tried stool softeners without significant relief. She will have alternating lose stools and hard stools. She denies overt pain, but rather simply feels full. If she would push it further and eat due to hunger she will have discomfort with bloated, gassy sensations. Sometimes the above occurs within one to two minutes or within fifteen minutes of eating. The above is interfering with her ability to undertake physical conditioning and training to maintain functional fitness for her job. She additionally can have pain occur in no relation to eating. She has not had weight loss and has no food fear. She denies fecal or urinary incontinence.    She has been to the ED due to this around 05/17/2024. CTA abdomen and pelvis revealed no acute pathology. Incidentally discovered was apparent celiac artery compression. She was referred to myself to further investigate the above sxs. Mesenteric duplex reveals rather significant celiac artery compression.  She had issues with BMs in her early 30s, got a colonosocpy at age 31 revealing an adenomatous polyp, and had none seen in f/u three years ago at age 36.             Review Of Systems  Skin: negative  Eyes: negative  Ears/Nose/Throat: negative  Respiratory: No shortness of breath, dyspnea on exertion, cough, or hemoptysis  Cardiovascular: negative  Gastrointestinal: as above  Genitourinary: negative  Musculoskeletal: negative  Neurologic: negative  Psychiatric:  negative  Hematologic/Lymphatic/Immunologic: negative  Endocrine: negative      PAST MEDICAL HISTORY:                No past medical history on file.    PAST SURGICAL HISTORY:                No past surgical history on file.    CURRENT MEDICATIONS:                  No current outpatient medications on file.       ALLERGIES:                Not on File    SOCIAL HISTORY:                  Social History     Socioeconomic History    Marital status:      Spouse name: Not on file    Number of children: Not on file    Years of education: Not on file    Highest education level: Not on file   Occupational History    Not on file   Tobacco Use    Smoking status: Not on file    Smokeless tobacco: Not on file   Substance and Sexual Activity    Alcohol use: Not on file    Drug use: Not on file    Sexual activity: Not on file   Other Topics Concern    Not on file   Social History Narrative    Not on file     Social Determinants of Health     Financial Resource Strain: Not on file   Food Insecurity: Not on file   Transportation Needs: Not on file   Physical Activity: Not on file   Stress: Not on file   Social Connections: Unknown (5/19/2024)    Received from [facility]    Social Connections     Frequency of Communication with Friends and Family: Not on file   Interpersonal Safety: Not on file   Housing Stability: Not on file       FAMILY HISTORY:                 No family history on file.      Physical exam Reveals:    O/P: WNL  HEENT: WNL  NECK: No JVD, thyromegaly, or lymphadenopathy  HEART: RRR, no murmurs, gallops, or rubs  LUNGS: CTA bilaterally without rales, wheezes, or rhonchi  GI: NABS, nondistended, mild RLQ tenderness to palpation, soft abdomen  EXT:without cyanosis, clubbing, or edema  NEURO: nonfocal  : no flank tenderness      Rt femoral: 3 plus palpable   Rt popliteal: 3 plus palpable      Lt femoral: 3 plus palpable   Lt popliteal: 3 plus palpable            All relevant  labs and imaging reviewed by myself on today's date.

## 2024-10-08 NOTE — PROGRESS NOTES
[facility]        Patient is here for a consult to discuss artery compression syndrome    Pt is currently taking no meds that would impact our treatment plan.    /80 (BP Location: Right arm, Patient Position: Sitting, Cuff Size: Adult Regular)   Pulse 70   Wt 149 lb 12.8 oz (67.9 kg)   SpO2 98%     The provider has been notified that the patient has no concerns.     Questions patient would like addressed today are: N/A.    Refills are needed: N/A    Has homecare services and agency name:  Jyoti Mathias MA

## 2024-10-19 ENCOUNTER — HEALTH MAINTENANCE LETTER (OUTPATIENT)
Age: 39
End: 2024-10-19

## 2025-08-05 ENCOUNTER — TELEPHONE (OUTPATIENT)
Dept: UROLOGY | Facility: CLINIC | Age: 40
End: 2025-08-05
Payer: COMMERCIAL

## 2025-08-19 ENCOUNTER — PRE VISIT (OUTPATIENT)
Dept: UROLOGY | Facility: CLINIC | Age: 40
End: 2025-08-19
Payer: COMMERCIAL

## 2025-08-22 ENCOUNTER — PRE VISIT (OUTPATIENT)
Dept: UROLOGY | Facility: CLINIC | Age: 40
End: 2025-08-22

## 2025-08-26 ENCOUNTER — TELEPHONE (OUTPATIENT)
Dept: UROLOGY | Facility: CLINIC | Age: 40
End: 2025-08-26
Payer: COMMERCIAL

## 2025-08-29 ENCOUNTER — PREP FOR PROCEDURE (OUTPATIENT)
Dept: SURGERY | Facility: CLINIC | Age: 40
End: 2025-08-29
Payer: COMMERCIAL